# Patient Record
Sex: FEMALE | Race: WHITE | NOT HISPANIC OR LATINO | Employment: FULL TIME | URBAN - METROPOLITAN AREA
[De-identification: names, ages, dates, MRNs, and addresses within clinical notes are randomized per-mention and may not be internally consistent; named-entity substitution may affect disease eponyms.]

---

## 2019-01-18 ENCOUNTER — APPOINTMENT (OUTPATIENT)
Dept: WOMENS IMAGING | Facility: HOSPITAL | Age: 44
End: 2019-01-18

## 2019-01-18 PROCEDURE — 77067 SCR MAMMO BI INCL CAD: CPT | Performed by: RADIOLOGY

## 2020-03-06 ENCOUNTER — APPOINTMENT (OUTPATIENT)
Dept: WOMENS IMAGING | Facility: HOSPITAL | Age: 45
End: 2020-03-06

## 2020-03-06 PROCEDURE — 77067 SCR MAMMO BI INCL CAD: CPT | Performed by: RADIOLOGY

## 2021-03-23 ENCOUNTER — APPOINTMENT (OUTPATIENT)
Dept: WOMENS IMAGING | Facility: HOSPITAL | Age: 46
End: 2021-03-23

## 2021-03-23 PROCEDURE — 77067 SCR MAMMO BI INCL CAD: CPT | Performed by: RADIOLOGY

## 2022-04-21 ENCOUNTER — TRANSCRIBE ORDERS (OUTPATIENT)
Dept: MAMMOGRAPHY | Facility: CLINIC | Age: 47
End: 2022-04-21

## 2022-04-21 DIAGNOSIS — Z12.31 ENCOUNTER FOR SCREENING MAMMOGRAM FOR MALIGNANT NEOPLASM OF BREAST: Primary | ICD-10-CM

## 2022-05-06 ENCOUNTER — APPOINTMENT (OUTPATIENT)
Dept: WOMENS IMAGING | Facility: HOSPITAL | Age: 47
End: 2022-05-06

## 2022-05-06 PROCEDURE — 77067 SCR MAMMO BI INCL CAD: CPT | Performed by: RADIOLOGY

## 2022-05-09 RX ORDER — SERTRALINE HYDROCHLORIDE 25 MG/1
TABLET, FILM COATED ORAL
Qty: 30 TABLET | Refills: 0 | Status: SHIPPED | OUTPATIENT
Start: 2022-05-09 | End: 2022-09-01

## 2022-09-01 RX ORDER — SERTRALINE HYDROCHLORIDE 25 MG/1
TABLET, FILM COATED ORAL
Qty: 30 TABLET | Refills: 0 | Status: SHIPPED | OUTPATIENT
Start: 2022-09-01 | End: 2023-01-09

## 2023-01-09 RX ORDER — SERTRALINE HYDROCHLORIDE 25 MG/1
TABLET, FILM COATED ORAL
Qty: 30 TABLET | Refills: 0 | Status: SHIPPED | OUTPATIENT
Start: 2023-01-09 | End: 2023-01-19 | Stop reason: SDUPTHER

## 2023-01-19 ENCOUNTER — OFFICE VISIT (OUTPATIENT)
Dept: FAMILY MEDICINE CLINIC | Facility: CLINIC | Age: 48
End: 2023-01-19
Payer: COMMERCIAL

## 2023-01-19 VITALS
SYSTOLIC BLOOD PRESSURE: 142 MMHG | WEIGHT: 187.1 LBS | DIASTOLIC BLOOD PRESSURE: 92 MMHG | OXYGEN SATURATION: 98 % | BODY MASS INDEX: 26.19 KG/M2 | TEMPERATURE: 97.7 F | HEART RATE: 73 BPM | HEIGHT: 71 IN

## 2023-01-19 DIAGNOSIS — E55.9 VITAMIN D DEFICIENCY: ICD-10-CM

## 2023-01-19 DIAGNOSIS — R03.0 ELEVATED BLOOD-PRESSURE READING WITHOUT DIAGNOSIS OF HYPERTENSION: ICD-10-CM

## 2023-01-19 DIAGNOSIS — Z12.12 SCREENING FOR COLORECTAL CANCER: ICD-10-CM

## 2023-01-19 DIAGNOSIS — Z00.00 ENCOUNTER FOR WELLNESS EXAMINATION IN ADULT: Primary | ICD-10-CM

## 2023-01-19 DIAGNOSIS — F41.1 GAD (GENERALIZED ANXIETY DISORDER): ICD-10-CM

## 2023-01-19 DIAGNOSIS — F51.04 PSYCHOPHYSIOLOGICAL INSOMNIA: ICD-10-CM

## 2023-01-19 DIAGNOSIS — E53.8 VITAMIN B12 DEFICIENCY: ICD-10-CM

## 2023-01-19 DIAGNOSIS — Z12.11 SCREENING FOR COLORECTAL CANCER: ICD-10-CM

## 2023-01-19 DIAGNOSIS — E66.3 OVERWEIGHT: ICD-10-CM

## 2023-01-19 PROCEDURE — 99396 PREV VISIT EST AGE 40-64: CPT | Performed by: FAMILY MEDICINE

## 2023-01-19 PROCEDURE — 99214 OFFICE O/P EST MOD 30 MIN: CPT | Performed by: FAMILY MEDICINE

## 2023-01-19 RX ORDER — ERGOCALCIFEROL 1.25 MG/1
CAPSULE ORAL
COMMUNITY
End: 2023-03-02

## 2023-01-19 RX ORDER — VITAMIN B COMPLEX
TABLET ORAL
COMMUNITY
End: 2023-03-02

## 2023-01-19 RX ORDER — TRAZODONE HYDROCHLORIDE 50 MG/1
50 TABLET ORAL NIGHTLY
Qty: 30 TABLET | Refills: 5 | Status: SHIPPED | OUTPATIENT
Start: 2023-01-19 | End: 2023-03-02 | Stop reason: SDUPTHER

## 2023-01-19 RX ORDER — SERTRALINE HYDROCHLORIDE 25 MG/1
25 TABLET, FILM COATED ORAL DAILY
Qty: 90 TABLET | Refills: 3 | Status: SHIPPED | OUTPATIENT
Start: 2023-01-19

## 2023-01-19 NOTE — ASSESSMENT & PLAN NOTE
Patient's (Body mass index is 26.1 kg/m².) indicates that they are overweight with health conditions that include none . Weight is unchanged. BMI is is above average; BMI management plan is completed. We discussed portion control and increasing exercise.

## 2023-01-19 NOTE — PROGRESS NOTES
Patient Name: Kandi Humphries  : 1975   MRN: [id number]     Chief Complaint:    Chief Complaint   Patient presents with   • Annual Exam       History of Present Illness: Kandi Humphries is a 47 y.o. female who is here today for their annual health maintenance and physical. Patient presents for follow-up on chronic medical problems including anxiety, vitamin D and vitamin B12 deficiency. Patient denies adverse effects of medications, headache, dizziness, chest pain, palpitations, shortness of breath and cough. Patient complains of worsening anxiety and insomnia. Patient is here for monitoring of chronic issues and fasting lab work.  She is up-to-date on Pap and mammogram, due for colon screening.  She denies family history of colon, breast or ovarian cancer.    Today she complains of worsening anxiety over the last 6 months.  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  She was previously only taking 12.5 mg of sertraline last week she increased to the full 25 mg tablet.  She has not seen a big difference in symptoms yet.  She also reports that she is waking almost every night between 2 and 3 AM with racing thoughts and difficulty going back to sleep.  She was prescribed trazodone years ago but she is hesitant to add another medication at this time.    Blood pressure is also elevated in office today.  She has been monitoring at home with a wrist cuff and reports blood pressures are between 130//90.  She does not have history of hypertension.             Review of Systems:   Review of Systems   Constitutional: Negative for chills, fatigue and fever.   Respiratory: Negative for cough and shortness of breath.    Cardiovascular: Negative for chest pain and palpitations.   Gastrointestinal: Negative for abdominal pain, constipation, diarrhea, nausea and vomiting.   Musculoskeletal: Negative for back pain and myalgias.   Neurological: Negative for  dizziness and headache.   Psychiatric/Behavioral: Positive for sleep disturbance and stress. Negative for depressed mood. The patient is nervous/anxious.        Past Medical History, Social History, Family History and Care Team were all reviewed with patient and updated as appropriate.     Medications:     Current Outpatient Medications:   •  Calcium Carbonate-Vitamin D (calcium-vitamin D) 500-200 MG-UNIT tablet per tablet, , Disp: , Rfl:   •  Cyanocobalamin 2500 MCG sublingual tablet, , Disp: , Rfl:   •  ergocalciferol (ERGOCALCIFEROL) 1.25 MG (11628 UT) capsule, , Disp: , Rfl:   •  Pediatric Multivitamins-Iron (multivitamin chewable) chewable tablet, take 1 tablet by oral route  every day with food, Disp: , Rfl:   •  sertraline (ZOLOFT) 25 MG tablet, Take 1 tablet by mouth Daily., Disp: 90 tablet, Rfl: 3  •  traZODone (DESYREL) 50 MG tablet, Take 1 tablet by mouth Every Night., Disp: 30 tablet, Rfl: 5    Allergies:   Allergies   Allergen Reactions   • Acetaminophen Rash   • Oxycodone Hcl Rash         Depression: PHQ-2 Depression Screening  PHQ-2 Total Score: 0   PHQ-9 Total Score: 0     Intimate partner violence: (Screen on initial visit, pregnant women, women with injuries, older adult with injury or evidence of neglect):  • Violence can be a problem in many people's lives, so I now ask every patient about trauma or abuse they may have experienced in a relationship.  • Stress/Safety - Do you feel safe in your relationship?  • Afraid/Abused - Have you ever been in a relationship where you were threatened, hurt, or afraid?  • Friend/Family - Are your friends aware you have been hurt?  • Emergency Plan - Do you have a safe place to go and the resources you need in an emergency?    Osteoporosis:   • Ost menopausal women < 65 with RF (advancing age, previous fracture, glucocorticoid therapy, parental hip fracture, low body weight, current cigarette smoking, excessive alcohol consumption, rheumatoid arthritis,  "secondary osteoporosis [hypogonadism/premature menopause, malabsorption, chronic liver disease, IBD]).  • All women 65 or older      Physical Exam:  Vital Signs:   Vitals:    01/19/23 1329   BP: 142/92   BP Location: Right arm   Patient Position: Sitting   Cuff Size: Large Adult   Pulse: 73   Temp: 97.7 °F (36.5 °C)   TempSrc: Temporal   SpO2: 98%   Weight: 84.9 kg (187 lb 1.6 oz)   Height: 180.3 cm (71\")     Body mass index is 26.1 kg/m².     Physical Exam  Vitals and nursing note reviewed.   Constitutional:       Appearance: Normal appearance.   HENT:      Head: Normocephalic and atraumatic.      Right Ear: Tympanic membrane and ear canal normal.      Left Ear: Tympanic membrane and ear canal normal.      Nose: Nose normal.      Mouth/Throat:      Mouth: Mucous membranes are moist.      Pharynx: Oropharynx is clear.   Eyes:      Conjunctiva/sclera: Conjunctivae normal.      Pupils: Pupils are equal, round, and reactive to light.   Cardiovascular:      Rate and Rhythm: Normal rate and regular rhythm.      Heart sounds: Normal heart sounds. No murmur heard.  Pulmonary:      Effort: Pulmonary effort is normal.      Breath sounds: Normal breath sounds. No wheezing, rhonchi or rales.   Abdominal:      General: Bowel sounds are normal.      Palpations: Abdomen is soft.      Tenderness: There is no abdominal tenderness.   Musculoskeletal:         General: Normal range of motion.      Cervical back: Normal range of motion and neck supple.      Right lower leg: No edema.      Left lower leg: No edema.   Lymphadenopathy:      Cervical: No cervical adenopathy.   Skin:     General: Skin is warm.      Findings: No rash.   Neurological:      General: No focal deficit present.      Mental Status: She is alert and oriented to person, place, and time.      Motor: No weakness.   Psychiatric:         Mood and Affect: Mood normal.         Behavior: Behavior normal.         Procedures      Assessment/Plan:   Diagnoses and all orders " for this visit:    1. Encounter for wellness examination in adult (Primary)  Assessment & Plan:  Patient will return for fasting labs, referral to GI placed for colon screening    Orders:  -     Hemoglobin A1c; Future  -     CBC Auto Differential; Future  -     Comprehensive Metabolic Panel; Future  -     Lipid Panel; Future  -     TSH; Future  -     T4, Free; Future    2. JACIEL (generalized anxiety disorder)  Assessment & Plan:  Psychological condition is worsening.  Increase Zoloft to 25 mg daily  Psychological condition  will be reassessed In 6 weeks.    Orders:  -     sertraline (ZOLOFT) 25 MG tablet; Take 1 tablet by mouth Daily.  Dispense: 90 tablet; Refill: 3    3. Psychophysiological insomnia  Assessment & Plan:  Psychological condition is worsening.  Rx trazodone 50 mg 1/2 to 1 tablet at bedtime as needed  Psychological condition  will be reassessed In 6 weeks.    Orders:  -     traZODone (DESYREL) 50 MG tablet; Take 1 tablet by mouth Every Night.  Dispense: 30 tablet; Refill: 5    4. Elevated blood-pressure reading without diagnosis of hypertension  Assessment & Plan:  Patient will monitor ambulatory blood pressures and follow-up with blood pressure log in 6 weeks      5. Vitamin D deficiency  -     Vitamin D,25-Hydroxy; Future    6. Vitamin B12 deficiency  -     Vitamin B12; Future    7. Screening for colorectal cancer  -     Ambulatory Referral to Gastroenterology    8. Overweight  Assessment & Plan:  Patient's (Body mass index is 26.1 kg/m².) indicates that they are overweight with health conditions that include none . Weight is unchanged. BMI is is above average; BMI management plan is completed. We discussed portion control and increasing exercise.            Follow Up:   Return in about 6 weeks (around 3/2/2023) for Med Recheck.    Healthcare Maintenance:   Counseling provided on Discussed injury prevention, diet and exercise, safe sexual practices, and screening for common diseases. Encouraged use of  sunscreen and seatbelts. Encouraged SBE, avoidance of tobacco, limiting alcohol, and yearly dental and eye exams.   .   Kandi Humphries voices understanding and acceptance of this advice and will call back with any further questions or concerns. AVS with preventive healthcare tips printed for patient.     Annalisa Smith DO  [facility] [place] [facility]

## 2023-01-19 NOTE — ASSESSMENT & PLAN NOTE
Psychological condition is worsening.  Rx trazodone 50 mg 1/2 to 1 tablet at bedtime as needed  Psychological condition  will be reassessed In 6 weeks.

## 2023-01-19 NOTE — ASSESSMENT & PLAN NOTE
Psychological condition is worsening.  Increase Zoloft to 25 mg daily  Psychological condition  will be reassessed In 6 weeks.

## 2023-01-20 ENCOUNTER — LAB (OUTPATIENT)
Dept: FAMILY MEDICINE CLINIC | Facility: CLINIC | Age: 48
End: 2023-01-20
Payer: COMMERCIAL

## 2023-01-20 DIAGNOSIS — E53.8 VITAMIN B12 DEFICIENCY: ICD-10-CM

## 2023-01-20 DIAGNOSIS — E55.9 VITAMIN D DEFICIENCY: ICD-10-CM

## 2023-01-20 DIAGNOSIS — Z00.00 ENCOUNTER FOR WELLNESS EXAMINATION IN ADULT: ICD-10-CM

## 2023-01-20 PROCEDURE — 36415 COLL VENOUS BLD VENIPUNCTURE: CPT | Performed by: FAMILY MEDICINE

## 2023-01-21 LAB
25(OH)D3+25(OH)D2 SERPL-MCNC: 80.2 NG/ML (ref 30–100)
ALBUMIN SERPL-MCNC: 4.7 G/DL (ref 3.8–4.8)
ALBUMIN/GLOB SERPL: 2.1 {RATIO} (ref 1.2–2.2)
ALP SERPL-CCNC: 57 IU/L (ref 44–121)
ALT SERPL-CCNC: 12 IU/L (ref 0–32)
AST SERPL-CCNC: 17 IU/L (ref 0–40)
BASOPHILS # BLD AUTO: 0 X10E3/UL (ref 0–0.2)
BASOPHILS NFR BLD AUTO: 1 %
BILIRUB SERPL-MCNC: 0.5 MG/DL (ref 0–1.2)
BUN SERPL-MCNC: 9 MG/DL (ref 6–24)
BUN/CREAT SERPL: 13 (ref 9–23)
CALCIUM SERPL-MCNC: 9.5 MG/DL (ref 8.7–10.2)
CHLORIDE SERPL-SCNC: 103 MMOL/L (ref 96–106)
CHOLEST SERPL-MCNC: 203 MG/DL (ref 100–199)
CO2 SERPL-SCNC: 25 MMOL/L (ref 20–29)
CREAT SERPL-MCNC: 0.68 MG/DL (ref 0.57–1)
EGFRCR SERPLBLD CKD-EPI 2021: 109 ML/MIN/1.73
EOSINOPHIL # BLD AUTO: 0.1 X10E3/UL (ref 0–0.4)
EOSINOPHIL NFR BLD AUTO: 3 %
ERYTHROCYTE [DISTWIDTH] IN BLOOD BY AUTOMATED COUNT: 12.1 % (ref 11.7–15.4)
GLOBULIN SER CALC-MCNC: 2.2 G/DL (ref 1.5–4.5)
GLUCOSE SERPL-MCNC: 75 MG/DL (ref 70–99)
HBA1C MFR BLD: 5 % (ref 4.8–5.6)
HCT VFR BLD AUTO: 41.6 % (ref 34–46.6)
HDLC SERPL-MCNC: 77 MG/DL
HGB BLD-MCNC: 14 G/DL (ref 11.1–15.9)
IMM GRANULOCYTES # BLD AUTO: 0 X10E3/UL (ref 0–0.1)
IMM GRANULOCYTES NFR BLD AUTO: 0 %
LDLC SERPL CALC-MCNC: 102 MG/DL (ref 0–99)
LYMPHOCYTES # BLD AUTO: 1.4 X10E3/UL (ref 0.7–3.1)
LYMPHOCYTES NFR BLD AUTO: 32 %
MCH RBC QN AUTO: 31.8 PG (ref 26.6–33)
MCHC RBC AUTO-ENTMCNC: 33.7 G/DL (ref 31.5–35.7)
MCV RBC AUTO: 95 FL (ref 79–97)
MONOCYTES # BLD AUTO: 0.3 X10E3/UL (ref 0.1–0.9)
MONOCYTES NFR BLD AUTO: 6 %
NEUTROPHILS # BLD AUTO: 2.6 X10E3/UL (ref 1.4–7)
NEUTROPHILS NFR BLD AUTO: 58 %
PLATELET # BLD AUTO: 303 X10E3/UL (ref 150–450)
POTASSIUM SERPL-SCNC: 4.5 MMOL/L (ref 3.5–5.2)
PROT SERPL-MCNC: 6.9 G/DL (ref 6–8.5)
RBC # BLD AUTO: 4.4 X10E6/UL (ref 3.77–5.28)
SODIUM SERPL-SCNC: 142 MMOL/L (ref 134–144)
T4 FREE SERPL-MCNC: 1.1 NG/DL (ref 0.82–1.77)
TRIGL SERPL-MCNC: 137 MG/DL (ref 0–149)
TSH SERPL DL<=0.005 MIU/L-ACNC: 3.78 UIU/ML (ref 0.45–4.5)
VIT B12 SERPL-MCNC: 1180 PG/ML (ref 232–1245)
VLDLC SERPL CALC-MCNC: 24 MG/DL (ref 5–40)
WBC # BLD AUTO: 4.4 X10E3/UL (ref 3.4–10.8)

## 2023-03-02 ENCOUNTER — OFFICE VISIT (OUTPATIENT)
Dept: FAMILY MEDICINE CLINIC | Facility: CLINIC | Age: 48
End: 2023-03-02
Payer: COMMERCIAL

## 2023-03-02 VITALS
SYSTOLIC BLOOD PRESSURE: 140 MMHG | TEMPERATURE: 97.8 F | OXYGEN SATURATION: 99 % | HEART RATE: 72 BPM | DIASTOLIC BLOOD PRESSURE: 88 MMHG | HEIGHT: 71 IN | WEIGHT: 183.2 LBS | BODY MASS INDEX: 25.65 KG/M2

## 2023-03-02 DIAGNOSIS — I10 PRIMARY HYPERTENSION: ICD-10-CM

## 2023-03-02 DIAGNOSIS — F41.1 GAD (GENERALIZED ANXIETY DISORDER): Primary | ICD-10-CM

## 2023-03-02 DIAGNOSIS — F51.04 PSYCHOPHYSIOLOGICAL INSOMNIA: ICD-10-CM

## 2023-03-02 DIAGNOSIS — E66.3 OVERWEIGHT: ICD-10-CM

## 2023-03-02 PROCEDURE — 99214 OFFICE O/P EST MOD 30 MIN: CPT | Performed by: FAMILY MEDICINE

## 2023-03-02 RX ORDER — TRAZODONE HYDROCHLORIDE 50 MG/1
50 TABLET ORAL NIGHTLY
Qty: 90 TABLET | Refills: 3 | Status: SHIPPED | OUTPATIENT
Start: 2023-03-02

## 2023-03-02 RX ORDER — LANOLIN ALCOHOL/MO/W.PET/CERES
CREAM (GRAM) TOPICAL
COMMUNITY
Start: 2023-02-01

## 2023-03-02 RX ORDER — OLMESARTAN MEDOXOMIL 5 MG/1
5 TABLET ORAL DAILY
Qty: 30 TABLET | Refills: 5 | Status: SHIPPED | OUTPATIENT
Start: 2023-03-02

## 2023-03-02 RX ORDER — MULTIVITAMIN WITH IRON
TABLET ORAL
COMMUNITY
Start: 2023-02-01

## 2023-03-02 RX ORDER — MULTIPLE VITAMINS W/ MINERALS TAB 9MG-400MCG
TAB ORAL
COMMUNITY
Start: 2023-02-01

## 2023-03-02 NOTE — PROGRESS NOTES
Date: 2023   Patient Name: Kandi Humphries  : 1975   MRN: [id number]     Chief Complaint:    Chief Complaint   Patient presents with   • Anxiety   • Hypertension       History of Present Illness: Kandi Humphries is a 47 y.o. female who is here today to follow up for Anxiety and elevated blood pressure.  Ambulatory blood pressures are for the most part mildly elevated.  She has been treated for hypertension in the past.  She denies headache, chest pain, or shortness of breath although has been having some intermittent episodes of chest pressure.    She reports anxiety is improved after increasing Zoloft to 25 mg.  She denies side effects to the medication would like to stay at her current dose.    Sleep is also improved after adding trazodone 50 mg at bedtime.  She denies side effects and would like to stay on the current dose.           Review of Systems:   Review of Systems   Constitutional: Negative for chills, fatigue and fever.   Respiratory: Negative for cough and shortness of breath.    Cardiovascular: Negative for chest pain and palpitations.   Gastrointestinal: Negative for abdominal pain, constipation, diarrhea, nausea and vomiting.   Musculoskeletal: Negative for back pain and myalgias.   Neurological: Negative for dizziness and headache.   Psychiatric/Behavioral: Positive for sleep disturbance. Negative for depressed mood. The patient is nervous/anxious.        Past Medical History:   Past Medical History:   Diagnosis Date   • H/O abdominoplasty    • Hypertension    • Monty-en-Y varices        Past Surgical History:   Past Surgical History:   Procedure Laterality Date   • GASTRIC BYPASS     • REDUCTION MAMMAPLASTY Bilateral        Family History:   Family History   Problem Relation Age of Onset   • Diabetes Mother    • Hypertension Mother    • Hyperlipidemia Mother    • Diabetes Sister    • Diabetes Paternal Grandmother    • Lung cancer Paternal Grandfather    • Heart attack  "Paternal Grandfather    • Coronary artery disease Paternal Grandfather    • Brain cancer Paternal Uncle        Social History:   Social History     Socioeconomic History   • Marital status:    Tobacco Use   • Smoking status: Never   • Smokeless tobacco: Never   Vaping Use   • Vaping Use: Never used   Substance and Sexual Activity   • Alcohol use: Yes     Comment: occas   • Drug use: Never   • Sexual activity: Yes     Partners: Male       Medications:     Current Outpatient Medications:   •  Calcium Citrate 333 MG tablet, , Disp: , Rfl:   •  Coenzyme Q10 (Co Q 10) 100 MG capsule, , Disp: , Rfl:   •  Garlic 1000 MG capsule, , Disp: , Rfl:   •  Magnesium 250 MG tablet, , Disp: , Rfl:   •  multivitamin with minerals tablet tablet, , Disp: , Rfl:   •  sertraline (ZOLOFT) 25 MG tablet, Take 1 tablet by mouth Daily., Disp: 90 tablet, Rfl: 3  •  traZODone (DESYREL) 50 MG tablet, Take 1 tablet by mouth Every Night., Disp: 90 tablet, Rfl: 3  •  vitamin B-12 (CYANOCOBALAMIN) 1000 MCG tablet, , Disp: , Rfl:   •  vitamin D3 125 MCG (5000 UT) capsule capsule, , Disp: , Rfl:   •  olmesartan (BENICAR) 5 MG tablet, Take 1 tablet by mouth Daily., Disp: 30 tablet, Rfl: 5    Allergies:   Allergies   Allergen Reactions   • Acetaminophen Rash   • Oxycodone Hcl Rash       PHQ-2 Total Score: 0   PHQ-9 Total Score: 0     Physical Exam:  Vital Signs:   Vitals:    03/02/23 0917   BP: 140/88   Pulse: 72   Temp: 97.8 °F (36.6 °C)   TempSrc: Temporal   SpO2: 99%   Weight: 83.1 kg (183 lb 3.2 oz)   Height: 180.3 cm (70.98\")     Body mass index is 25.56 kg/m².     Physical Exam  Vitals and nursing note reviewed.   Constitutional:       Appearance: Normal appearance.   Cardiovascular:      Rate and Rhythm: Normal rate and regular rhythm.      Heart sounds: Normal heart sounds. No murmur heard.  Pulmonary:      Effort: Pulmonary effort is normal.      Breath sounds: Normal breath sounds. No wheezing.   Abdominal:      General: Bowel sounds " are normal.      Palpations: Abdomen is soft.   Neurological:      Mental Status: She is alert and oriented to person, place, and time. Mental status is at baseline.   Psychiatric:         Mood and Affect: Mood normal.         Behavior: Behavior normal.           Assessment/Plan:   Diagnoses and all orders for this visit:    1. JACIEL (generalized anxiety disorder) (Primary)  Assessment & Plan:  Psychological condition is improving with treatment.  Continue current treatment regimen.  Psychological condition  will be reassessed in 4 weeks.      2. Psychophysiological insomnia  Assessment & Plan:  Psychological condition is improving with treatment.  Continue current treatment regimen.  Psychological condition  will be reassessed in 4 weeks.    Orders:  -     traZODone (DESYREL) 50 MG tablet; Take 1 tablet by mouth Every Night.  Dispense: 90 tablet; Refill: 3    3. Primary hypertension  Assessment & Plan:  Hypertension is newly identified.  Weight loss.  Regular aerobic exercise.  Ambulatory blood pressure monitoring.  Start olmesartan 5 mg daily  Blood pressure will be reassessed in 4 weeks.    Orders:  -     olmesartan (BENICAR) 5 MG tablet; Take 1 tablet by mouth Daily.  Dispense: 30 tablet; Refill: 5    4. Overweight  Assessment & Plan:  Patient's (Body mass index is 25.56 kg/m².) indicates that they are overweight with health conditions that include hypertension . Weight is unchanged. BMI is is above average; BMI management plan is completed. We discussed portion control and increasing exercise.            Follow Up:   Return in about 1 month (around 4/2/2023) for BP check.    Annalisa Smith DO  [facility] [place] [facility]

## 2023-03-02 NOTE — ASSESSMENT & PLAN NOTE
Hypertension is newly identified.  Weight loss.  Regular aerobic exercise.  Ambulatory blood pressure monitoring.  Start olmesartan 5 mg daily  Blood pressure will be reassessed in 4 weeks.

## 2023-03-02 NOTE — ASSESSMENT & PLAN NOTE
Patient's (Body mass index is 25.56 kg/m².) indicates that they are overweight with health conditions that include hypertension . Weight is unchanged. BMI is is above average; BMI management plan is completed. We discussed portion control and increasing exercise.

## 2023-04-07 ENCOUNTER — OFFICE VISIT (OUTPATIENT)
Dept: FAMILY MEDICINE CLINIC | Facility: CLINIC | Age: 48
End: 2023-04-07
Payer: COMMERCIAL

## 2023-04-07 VITALS
SYSTOLIC BLOOD PRESSURE: 118 MMHG | HEART RATE: 71 BPM | DIASTOLIC BLOOD PRESSURE: 71 MMHG | WEIGHT: 186.1 LBS | BODY MASS INDEX: 26.64 KG/M2 | HEIGHT: 70 IN | TEMPERATURE: 97.3 F | OXYGEN SATURATION: 100 %

## 2023-04-07 DIAGNOSIS — F51.04 PSYCHOPHYSIOLOGICAL INSOMNIA: ICD-10-CM

## 2023-04-07 DIAGNOSIS — I10 PRIMARY HYPERTENSION: Primary | ICD-10-CM

## 2023-04-07 DIAGNOSIS — E66.3 OVERWEIGHT: ICD-10-CM

## 2023-04-07 PROBLEM — E66.01 MORBID OBESITY DUE TO EXCESS CALORIES: Status: ACTIVE | Noted: 2023-02-17

## 2023-04-07 NOTE — ASSESSMENT & PLAN NOTE
Patient's (Body mass index is 26.7 kg/m².) indicates that they are overweight with health conditions that include hypertension . Weight is unchanged. BMI is is above average; BMI management plan is completed. We discussed portion control and increasing exercise.

## 2023-04-07 NOTE — ASSESSMENT & PLAN NOTE
Psychological condition is improving with treatment.  Increase trazodone to 75 mg at bedtime  Psychological condition  will be reassessed in 3 months.

## 2023-04-24 ENCOUNTER — TRANSCRIBE ORDERS (OUTPATIENT)
Dept: CT IMAGING | Facility: CLINIC | Age: 48
End: 2023-04-24
Payer: COMMERCIAL

## 2023-04-24 DIAGNOSIS — Z12.31 ENCOUNTER FOR SCREENING MAMMOGRAM FOR MALIGNANT NEOPLASM OF BREAST: Primary | ICD-10-CM

## 2023-08-09 DIAGNOSIS — I10 PRIMARY HYPERTENSION: ICD-10-CM

## 2023-08-09 RX ORDER — OLMESARTAN MEDOXOMIL 5 MG/1
TABLET ORAL
Qty: 90 TABLET | Refills: 1 | Status: SHIPPED | OUTPATIENT
Start: 2023-08-09

## 2023-11-30 ENCOUNTER — OFFICE VISIT (OUTPATIENT)
Dept: FAMILY MEDICINE CLINIC | Facility: CLINIC | Age: 48
End: 2023-11-30
Payer: COMMERCIAL

## 2023-11-30 VITALS
OXYGEN SATURATION: 97 % | HEART RATE: 69 BPM | BODY MASS INDEX: 27.49 KG/M2 | DIASTOLIC BLOOD PRESSURE: 72 MMHG | HEIGHT: 70 IN | WEIGHT: 192 LBS | SYSTOLIC BLOOD PRESSURE: 120 MMHG

## 2023-11-30 DIAGNOSIS — J30.1 SEASONAL ALLERGIC RHINITIS DUE TO POLLEN: Primary | ICD-10-CM

## 2023-11-30 PROCEDURE — 99213 OFFICE O/P EST LOW 20 MIN: CPT | Performed by: STUDENT IN AN ORGANIZED HEALTH CARE EDUCATION/TRAINING PROGRAM

## 2023-11-30 RX ORDER — LEVOMEFOLATE CALCIUM 15 MG
15 TABLET ORAL DAILY
COMMUNITY

## 2023-11-30 RX ORDER — AZELASTINE 1 MG/ML
2 SPRAY, METERED NASAL 2 TIMES DAILY
Qty: 30 ML | Refills: 1 | Status: SHIPPED | OUTPATIENT
Start: 2023-11-30

## 2023-11-30 NOTE — PROGRESS NOTES
"Chief Complaint  Establish Care (Pt is transferring care from [place]. Her spouse sees Dr. Monreal and pt wants a provider in [place]. )    Subjective          Kandi Humphries presents to [facility]  History of Present Illness    Patient is here to establish care.     She states that she has been seeing Dr. Smith at [facility]. She states that she lives here in town and it is closer for her to come here.     She states that overall she is doing well at this time, but has had some increased fullness in her ears. She states that she does not need medications refills at this time on her maintenance mediations.         Objective   Vital Signs:   /72   Pulse 69   Ht 177.8 cm (70\")   Wt 87.1 kg (192 lb)   SpO2 97%   BMI 27.55 kg/m²     Body mass index is 27.55 kg/m².    Review of Systems    Past History:  Medical History: has a past medical history of Allergic, Anxiety, Depression, H/O abdominoplasty, Hypertension, and Monty-en-Y varices (2011).   Surgical History: has a past surgical history that includes Reduction mammaplasty (Bilateral, 2013); Gastric bypass; Bariatric Surgery (2011); Cosmetic surgery (2013); Endometrial ablation (May 2020); Colonoscopy (2023); and Eye surgery (2005).   Family History: family history includes Brain cancer in her paternal uncle; Coronary artery disease in her paternal grandfather; Depression in her mother and sister; Diabetes in her mother, paternal grandmother, and sister; Heart attack in her paternal grandfather; Heart disease in her paternal grandfather; Hyperlipidemia in her mother; Hypertension in her father and mother; Lung cancer in her paternal grandfather.   Social History: reports that she has never smoked. She has never used smokeless tobacco. She reports current alcohol use of about 3.0 standard drinks of alcohol per week. She reports that she does not use drugs.      Current Outpatient Medications:     azelastine " (ASTELIN) 0.1 % nasal spray, 2 sprays into the nostril(s) as directed by provider 2 (Two) Times a Day. Use in each nostril as directed, Disp: 30 mL, Rfl: 1    Calcium Citrate 333 MG tablet, , Disp: , Rfl:     Coenzyme Q10 (Co Q 10) 100 MG capsule, , Disp: , Rfl:     Garlic 1000 MG capsule, , Disp: , Rfl:     Iron Combinations (IRON COMPLEX PO), Take  by mouth., Disp: , Rfl:     l-methylfolate 15 MG tablet tablet, Take 1 tablet by mouth Daily., Disp: , Rfl:     Magnesium 250 MG tablet, , Disp: , Rfl:     multivitamin with minerals tablet tablet, , Disp: , Rfl:     olmesartan (BENICAR) 5 MG tablet, Take 1 tablet by mouth once daily, Disp: 90 tablet, Rfl: 1    sertraline (ZOLOFT) 25 MG tablet, Take 1 tablet by mouth Daily., Disp: 90 tablet, Rfl: 3    traZODone (DESYREL) 50 MG tablet, Take 1 tablet by mouth Every Night., Disp: 90 tablet, Rfl: 3    vitamin B-12 (CYANOCOBALAMIN) 1000 MCG tablet, , Disp: , Rfl:     vitamin D3 125 MCG (5000 UT) capsule capsule, , Disp: , Rfl:     Allergies: Acetaminophen and Oxycodone hcl    Physical Exam  Constitutional:       General: She is not in acute distress.     Appearance: She is not ill-appearing or toxic-appearing.   HENT:      Head: Normocephalic and atraumatic.   Cardiovascular:      Rate and Rhythm: Normal rate and regular rhythm.      Heart sounds: No murmur heard.  Pulmonary:      Effort: Pulmonary effort is normal. No respiratory distress.   Neurological:      General: No focal deficit present.      Mental Status: She is alert and oriented to person, place, and time.   Psychiatric:         Mood and Affect: Mood normal.         Thought Content: Thought content normal.          Result Review :                   Assessment and Plan    Diagnoses and all orders for this visit:    1. Seasonal allergic rhinitis due to pollen (Primary)    Other orders  -     azelastine (ASTELIN) 0.1 % nasal spray; 2 sprays into the nostril(s) as directed by provider 2 (Two) Times a Day. Use in each  nostril as directed  Dispense: 30 mL; Refill: 1    Reviewed patient's medical history with her in the office today, and overall she is doing very well at this time.    Will do azelastine nasal spray to help with allergic rhinitis and effusions of the ears. Call with any new or worsening symptoms.     Will see back in January for annual physical.     Follow Up   No follow-ups on file.  Patient was given instructions and counseling regarding her condition or for health maintenance advice. Please see specific information pulled into the AVS if appropriate.     Marquita Monreal, DO

## 2024-02-01 ENCOUNTER — OFFICE VISIT (OUTPATIENT)
Dept: FAMILY MEDICINE CLINIC | Facility: CLINIC | Age: 49
End: 2024-02-01
Payer: COMMERCIAL

## 2024-02-01 VITALS
DIASTOLIC BLOOD PRESSURE: 76 MMHG | HEART RATE: 78 BPM | BODY MASS INDEX: 28.06 KG/M2 | HEIGHT: 70 IN | WEIGHT: 196 LBS | SYSTOLIC BLOOD PRESSURE: 120 MMHG | OXYGEN SATURATION: 98 %

## 2024-02-01 DIAGNOSIS — Z00.00 WELL ADULT EXAM: Primary | ICD-10-CM

## 2024-02-01 DIAGNOSIS — F41.1 GAD (GENERALIZED ANXIETY DISORDER): ICD-10-CM

## 2024-02-01 DIAGNOSIS — I10 PRIMARY HYPERTENSION: ICD-10-CM

## 2024-02-01 DIAGNOSIS — J30.1 SEASONAL ALLERGIC RHINITIS DUE TO POLLEN: ICD-10-CM

## 2024-02-01 PROCEDURE — 99396 PREV VISIT EST AGE 40-64: CPT | Performed by: STUDENT IN AN ORGANIZED HEALTH CARE EDUCATION/TRAINING PROGRAM

## 2024-02-01 RX ORDER — BLACK COHOSH ROOT EXTRACT 80 MG
CAPSULE ORAL
COMMUNITY

## 2024-02-01 NOTE — PROGRESS NOTES
"Chief Complaint  Annual Exam    Subjective          Kandi Humphries presents to [facility]  History of Present Illness    Patient is here for well visit.     She states that she has been having trouble with her knees. She sates that she used to have a sit to stand desk which helped out with this, but she does not have this now that she is working from home. She states that it is not somehting that bothers her on a daily basis, but she wanted to mention it.     She states that otherwise she is doing well. All of her medications are doing well and she is tolerating them without side effects. She states that she does not need refills.     She is up todate on cervical, breast and colon cancer screening.     She is due for blood work at this time.     Objective   Vital Signs:   /76   Pulse 78   Ht 177.8 cm (70\")   Wt 88.9 kg (196 lb)   SpO2 98%   BMI 28.12 kg/m²     Body mass index is 28.12 kg/m².    Review of Systems    Past History:  Medical History: has a past medical history of Allergic, Anxiety, Depression, H/O abdominoplasty, Hypertension, and Monty-en-Y varices (2011).   Surgical History: has a past surgical history that includes Reduction mammaplasty (Bilateral, 2013); Gastric bypass; Bariatric Surgery (2011); Cosmetic surgery (2013); Endometrial ablation (May 2020); Colonoscopy (2023); and Eye surgery (2005).   Family History: family history includes Brain cancer in her paternal uncle; Coronary artery disease in her paternal grandfather; Depression in her mother and sister; Diabetes in her mother, paternal grandmother, and sister; Heart attack in her paternal grandfather; Heart disease in her paternal grandfather; Hyperlipidemia in her mother; Hypertension in her father and mother; Lung cancer in her paternal grandfather; Vision loss in her paternal grandfather.   Social History: reports that she has never smoked. She has never used smokeless tobacco. She reports current " alcohol use of about 3.0 standard drinks of alcohol per week. She reports that she does not use drugs.      Current Outpatient Medications:     Calcium Citrate 333 MG tablet, , Disp: , Rfl:     Coenzyme Q10 (Co Q 10) 100 MG capsule, , Disp: , Rfl:     Iron Combinations (IRON COMPLEX PO), Take  by mouth., Disp: , Rfl:     l-methylfolate 15 MG tablet tablet, Take 1 tablet by mouth Daily., Disp: , Rfl:     Magnesium 250 MG tablet, , Disp: , Rfl:     multivitamin with minerals tablet tablet, , Disp: , Rfl:     olmesartan (BENICAR) 5 MG tablet, Take 1 tablet by mouth once daily, Disp: 90 tablet, Rfl: 1    Omega 3-6-9 Fatty Acids (Omega 3-6-9 Complex) capsule, , Disp: , Rfl:     sertraline (ZOLOFT) 25 MG tablet, Take 1 tablet by mouth Daily., Disp: 90 tablet, Rfl: 3    traZODone (DESYREL) 50 MG tablet, Take 1 tablet by mouth Every Night., Disp: 90 tablet, Rfl: 3    vitamin B-12 (CYANOCOBALAMIN) 1000 MCG tablet, , Disp: , Rfl:     vitamin D3 125 MCG (5000 UT) capsule capsule, , Disp: , Rfl:     Allergies: Acetaminophen and Oxycodone hcl    Physical Exam  Constitutional:       General: She is not in acute distress.     Appearance: She is not ill-appearing or toxic-appearing.   HENT:      Head: Normocephalic and atraumatic.   Cardiovascular:      Rate and Rhythm: Normal rate and regular rhythm.      Heart sounds: No murmur heard.  Pulmonary:      Effort: Pulmonary effort is normal. No respiratory distress.   Neurological:      General: No focal deficit present.      Mental Status: She is alert and oriented to person, place, and time.   Psychiatric:         Mood and Affect: Mood normal.         Thought Content: Thought content normal.          Result Review :                   Assessment and Plan    Diagnoses and all orders for this visit:    1. Well adult exam (Primary)  -     Comprehensive Metabolic Panel  -     CBC & Differential  -     Hemoglobin A1c  -     Lipid Panel  -     TSH  -     T4, Free    2. Primary  hypertension    3. Seasonal allergic rhinitis due to pollen    4. JACIEL (generalized anxiety disorder)    Blood work ordered and will contact with results when available. Will adjust medications based on abnormalities seen.     Patient has had gastric bypass surgery and would likely benefit from early bone density screening.     Past medical and surgical history as well as allergies, family history and social history were reviewed, and discussed with patient.  Chronic conditions were reviewed as well as medications.   Anticipatory guidance handouts including healthy diet, health maintenance, as well as regular exercise and general instructions were given via [employer], and patient was able to ask questions and discuss any concerns.        Follow Up   No follow-ups on file.  Patient was given instructions and counseling regarding her condition or for health maintenance advice. Please see specific information pulled into the AVS if appropriate.     Marquita Monreal, DO

## 2024-02-02 DIAGNOSIS — F41.1 GAD (GENERALIZED ANXIETY DISORDER): ICD-10-CM

## 2024-02-02 DIAGNOSIS — F51.04 PSYCHOPHYSIOLOGICAL INSOMNIA: ICD-10-CM

## 2024-02-02 DIAGNOSIS — I10 PRIMARY HYPERTENSION: ICD-10-CM

## 2024-02-02 LAB
ALBUMIN SERPL-MCNC: 4.6 G/DL (ref 3.9–4.9)
ALBUMIN/GLOB SERPL: 2.3 {RATIO} (ref 1.2–2.2)
ALP SERPL-CCNC: 55 IU/L (ref 44–121)
ALT SERPL-CCNC: 23 IU/L (ref 0–32)
AST SERPL-CCNC: 22 IU/L (ref 0–40)
BASOPHILS # BLD AUTO: 0 X10E3/UL (ref 0–0.2)
BASOPHILS NFR BLD AUTO: 0 %
BILIRUB SERPL-MCNC: 0.2 MG/DL (ref 0–1.2)
BUN SERPL-MCNC: 16 MG/DL (ref 6–24)
BUN/CREAT SERPL: 22 (ref 9–23)
CALCIUM SERPL-MCNC: 9.2 MG/DL (ref 8.7–10.2)
CHLORIDE SERPL-SCNC: 104 MMOL/L (ref 96–106)
CHOLEST SERPL-MCNC: 190 MG/DL (ref 100–199)
CO2 SERPL-SCNC: 24 MMOL/L (ref 20–29)
CREAT SERPL-MCNC: 0.73 MG/DL (ref 0.57–1)
EGFRCR SERPLBLD CKD-EPI 2021: 103 ML/MIN/1.73
EOSINOPHIL # BLD AUTO: 0.1 X10E3/UL (ref 0–0.4)
EOSINOPHIL NFR BLD AUTO: 2 %
ERYTHROCYTE [DISTWIDTH] IN BLOOD BY AUTOMATED COUNT: 12.1 % (ref 11.7–15.4)
GLOBULIN SER CALC-MCNC: 2 G/DL (ref 1.5–4.5)
GLUCOSE SERPL-MCNC: 90 MG/DL (ref 70–99)
HBA1C MFR BLD: 5 % (ref 4.8–5.6)
HCT VFR BLD AUTO: 40.7 % (ref 34–46.6)
HDLC SERPL-MCNC: 67 MG/DL
HGB BLD-MCNC: 13.3 G/DL (ref 11.1–15.9)
IMM GRANULOCYTES # BLD AUTO: 0 X10E3/UL (ref 0–0.1)
IMM GRANULOCYTES NFR BLD AUTO: 0 %
LDLC SERPL CALC-MCNC: 86 MG/DL (ref 0–99)
LYMPHOCYTES # BLD AUTO: 2.3 X10E3/UL (ref 0.7–3.1)
LYMPHOCYTES NFR BLD AUTO: 33 %
MCH RBC QN AUTO: 31.7 PG (ref 26.6–33)
MCHC RBC AUTO-ENTMCNC: 32.7 G/DL (ref 31.5–35.7)
MCV RBC AUTO: 97 FL (ref 79–97)
MONOCYTES # BLD AUTO: 0.4 X10E3/UL (ref 0.1–0.9)
MONOCYTES NFR BLD AUTO: 5 %
NEUTROPHILS # BLD AUTO: 4.1 X10E3/UL (ref 1.4–7)
NEUTROPHILS NFR BLD AUTO: 60 %
PLATELET # BLD AUTO: 315 X10E3/UL (ref 150–450)
POTASSIUM SERPL-SCNC: 4.5 MMOL/L (ref 3.5–5.2)
PROT SERPL-MCNC: 6.6 G/DL (ref 6–8.5)
RBC # BLD AUTO: 4.2 X10E6/UL (ref 3.77–5.28)
SODIUM SERPL-SCNC: 141 MMOL/L (ref 134–144)
T4 FREE SERPL-MCNC: 1.05 NG/DL (ref 0.82–1.77)
TRIGL SERPL-MCNC: 226 MG/DL (ref 0–149)
TSH SERPL DL<=0.005 MIU/L-ACNC: 1.26 UIU/ML (ref 0.45–4.5)
VLDLC SERPL CALC-MCNC: 37 MG/DL (ref 5–40)
WBC # BLD AUTO: 6.9 X10E3/UL (ref 3.4–10.8)

## 2024-02-02 RX ORDER — TRAZODONE HYDROCHLORIDE 50 MG/1
50 TABLET ORAL NIGHTLY
Qty: 90 TABLET | Refills: 3 | Status: SHIPPED | OUTPATIENT
Start: 2024-02-02

## 2024-02-02 RX ORDER — OLMESARTAN MEDOXOMIL 5 MG/1
5 TABLET ORAL DAILY
Qty: 90 TABLET | Refills: 1 | Status: SHIPPED | OUTPATIENT
Start: 2024-02-02

## 2024-02-02 RX ORDER — SERTRALINE HYDROCHLORIDE 25 MG/1
25 TABLET, FILM COATED ORAL DAILY
Qty: 90 TABLET | Refills: 3 | Status: SHIPPED | OUTPATIENT
Start: 2024-02-02

## 2024-05-13 ENCOUNTER — TRANSCRIBE ORDERS (OUTPATIENT)
Dept: MAMMOGRAPHY | Facility: CLINIC | Age: 49
End: 2024-05-13
Payer: COMMERCIAL

## 2024-05-13 DIAGNOSIS — Z12.31 ENCOUNTER FOR SCREENING MAMMOGRAM FOR MALIGNANT NEOPLASM OF BREAST: Primary | ICD-10-CM

## 2024-05-21 ENCOUNTER — OFFICE VISIT (OUTPATIENT)
Dept: FAMILY MEDICINE CLINIC | Facility: CLINIC | Age: 49
End: 2024-05-21
Payer: COMMERCIAL

## 2024-05-21 VITALS
OXYGEN SATURATION: 98 % | HEIGHT: 70 IN | BODY MASS INDEX: 28.92 KG/M2 | DIASTOLIC BLOOD PRESSURE: 80 MMHG | SYSTOLIC BLOOD PRESSURE: 122 MMHG | WEIGHT: 202 LBS | HEART RATE: 75 BPM

## 2024-05-21 DIAGNOSIS — M25.512 ACUTE PAIN OF LEFT SHOULDER: Primary | ICD-10-CM

## 2024-05-21 PROCEDURE — 99213 OFFICE O/P EST LOW 20 MIN: CPT | Performed by: STUDENT IN AN ORGANIZED HEALTH CARE EDUCATION/TRAINING PROGRAM

## 2024-05-21 RX ORDER — LANSOPRAZOLE 30 MG/1
30 CAPSULE, DELAYED RELEASE ORAL DAILY
COMMUNITY

## 2024-05-21 RX ORDER — PREDNISONE 10 MG/1
40 TABLET ORAL DAILY
Qty: 20 TABLET | Refills: 0 | Status: SHIPPED | OUTPATIENT
Start: 2024-05-21 | End: 2024-05-26

## 2024-05-21 NOTE — PROGRESS NOTES
"Chief Complaint  Shoulder Pain (Left shoulder pain x4 weeks)    Subjective          Kandi Humphries presents to [facility]  History of Present Illness    Patient states that about a month ago she woke up with pain and had no injury that she is aware of. She states that she has been having trouble with movement especially when she is pulling her arm back into extension.  She does not have a significant decrease in range of motion, but has motions that are more painful.  She has done occasional anti-inflammatories without consistent relief.    Objective   Vital Signs:   /80   Pulse 75   Ht 177.8 cm (70\")   Wt 91.6 kg (202 lb)   SpO2 98%   BMI 28.98 kg/m²     Body mass index is 28.98 kg/m².    Review of Systems    Past History:  Medical History: has a past medical history of Allergic, Anxiety, Depression, H/O abdominoplasty, Hypertension, and Monty-en-Y varices (2011).   Surgical History: has a past surgical history that includes Reduction mammaplasty (Bilateral, 2013); Gastric bypass; Bariatric Surgery (2011); Cosmetic surgery (2013); Endometrial ablation (May 2020); Colonoscopy (2023); and Eye surgery (2005).   Family History: family history includes Brain cancer in her paternal uncle; Coronary artery disease in her paternal grandfather; Depression in her mother and sister; Diabetes in her mother, paternal grandmother, and sister; Heart attack in her paternal grandfather; Heart disease in her paternal grandfather; Hyperlipidemia in her mother; Hypertension in her father and mother; Lung cancer in her paternal grandfather; Vision loss in her paternal grandfather.   Social History: reports that she has never smoked. She has never used smokeless tobacco. She reports current alcohol use of about 3.0 standard drinks of alcohol per week. She reports that she does not use drugs.      Current Outpatient Medications:     Calcium Citrate 333 MG tablet, , Disp: , Rfl:     Coenzyme Q10 " (Co Q 10) 100 MG capsule, , Disp: , Rfl:     Iron Combinations (IRON COMPLEX PO), Take  by mouth., Disp: , Rfl:     l-methylfolate 15 MG tablet tablet, Take 1 tablet by mouth Daily., Disp: , Rfl:     lansoprazole (PREVACID) 30 MG capsule, Take 1 capsule by mouth Daily., Disp: , Rfl:     Magnesium 250 MG tablet, , Disp: , Rfl:     multivitamin with minerals tablet tablet, , Disp: , Rfl:     olmesartan (BENICAR) 5 MG tablet, Take 1 tablet by mouth Daily., Disp: 90 tablet, Rfl: 1    Omega 3-6-9 Fatty Acids (Omega 3-6-9 Complex) capsule, , Disp: , Rfl:     predniSONE (DELTASONE) 10 MG tablet, Take 4 tablets by mouth Daily for 5 days., Disp: 20 tablet, Rfl: 0    sertraline (ZOLOFT) 25 MG tablet, Take 1 tablet by mouth Daily., Disp: 90 tablet, Rfl: 3    traZODone (DESYREL) 50 MG tablet, Take 1 tablet by mouth Every Night., Disp: 90 tablet, Rfl: 3    vitamin B-12 (CYANOCOBALAMIN) 1000 MCG tablet, , Disp: , Rfl:     vitamin D3 125 MCG (5000 UT) capsule capsule, , Disp: , Rfl:     Allergies: Acetaminophen and Oxycodone hcl    Physical Exam  Constitutional:       General: She is not in acute distress.     Appearance: She is not ill-appearing or toxic-appearing.   HENT:      Head: Normocephalic and atraumatic.   Cardiovascular:      Rate and Rhythm: Normal rate and regular rhythm.      Heart sounds: No murmur heard.  Pulmonary:      Effort: Pulmonary effort is normal. No respiratory distress.   Musculoskeletal:      Right lower leg: No edema.      Left lower leg: No edema.      Comments: Pain with palpation of the posterior aspect of the shoulder.  No significant decreased range of motion, but tender with full extension of the shoulder.   Neurological:      General: No focal deficit present.      Mental Status: She is alert and oriented to person, place, and time.   Psychiatric:         Mood and Affect: Mood normal.         Thought Content: Thought content normal.          Result Review :                   Assessment and Plan     Diagnoses and all orders for this visit:    1. Acute pain of left shoulder (Primary)  -     XR Shoulder 2+ View Left (In Office)    Other orders  -     predniSONE (DELTASONE) 10 MG tablet; Take 4 tablets by mouth Daily for 5 days.  Dispense: 20 tablet; Refill: 0    Will do Xray today and contact with results. prednisone for pain.  If no improvement in the next 7 to 10 days will order PT. Advised follow up in 4-6 weeks to discuss advanced imaging.       Follow Up   No follow-ups on file.  Patient was given instructions and counseling regarding her condition or for health maintenance advice. Please see specific information pulled into the AVS if appropriate.     Marquita Monreal, DO

## 2024-07-30 ENCOUNTER — PATIENT MESSAGE (OUTPATIENT)
Dept: FAMILY MEDICINE CLINIC | Facility: CLINIC | Age: 49
End: 2024-07-30
Payer: COMMERCIAL

## 2024-07-30 DIAGNOSIS — I10 PRIMARY HYPERTENSION: ICD-10-CM

## 2024-07-30 RX ORDER — OLMESARTAN MEDOXOMIL 5 MG/1
5 TABLET ORAL DAILY
Qty: 90 TABLET | Refills: 1 | Status: SHIPPED | OUTPATIENT
Start: 2024-07-30

## 2024-08-12 ENCOUNTER — OFFICE VISIT (OUTPATIENT)
Age: 49
End: 2024-08-12
Payer: COMMERCIAL

## 2024-08-12 ENCOUNTER — LAB (OUTPATIENT)
Dept: LAB | Facility: HOSPITAL | Age: 49
End: 2024-08-12
Payer: COMMERCIAL

## 2024-08-12 VITALS
HEIGHT: 55 IN | DIASTOLIC BLOOD PRESSURE: 70 MMHG | SYSTOLIC BLOOD PRESSURE: 136 MMHG | HEART RATE: 62 BPM | WEIGHT: 201.8 LBS | BODY MASS INDEX: 46.7 KG/M2

## 2024-08-12 DIAGNOSIS — Z98.84 HISTORY OF ROUX-EN-Y GASTRIC BYPASS: Primary | ICD-10-CM

## 2024-08-12 DIAGNOSIS — E66.3 OVERWEIGHT: ICD-10-CM

## 2024-08-12 DIAGNOSIS — Z71.3 DIETARY COUNSELING: ICD-10-CM

## 2024-08-12 DIAGNOSIS — Z98.84 HISTORY OF ROUX-EN-Y GASTRIC BYPASS: ICD-10-CM

## 2024-08-12 LAB
25(OH)D3 SERPL-MCNC: 78 NG/ML (ref 30–100)
FERRITIN SERPL-MCNC: 112 NG/ML (ref 13–150)
HBA1C MFR BLD: 4.7 % (ref 4.8–5.6)
IRON 24H UR-MRATE: 164 MCG/DL (ref 37–145)
MAGNESIUM SERPL-MCNC: 2.4 MG/DL (ref 1.6–2.6)
PHOSPHATE SERPL-MCNC: 3 MG/DL (ref 2.5–4.5)
PREALB SERPL-MCNC: 32.4 MG/DL (ref 20–40)
PTH-INTACT SERPL-MCNC: 36.9 PG/ML (ref 15–65)

## 2024-08-12 PROCEDURE — 82728 ASSAY OF FERRITIN: CPT

## 2024-08-12 PROCEDURE — 84597 ASSAY OF VITAMIN K: CPT

## 2024-08-12 PROCEDURE — 36415 COLL VENOUS BLD VENIPUNCTURE: CPT

## 2024-08-12 PROCEDURE — 83735 ASSAY OF MAGNESIUM: CPT

## 2024-08-12 PROCEDURE — 83540 ASSAY OF IRON: CPT

## 2024-08-12 PROCEDURE — 84425 ASSAY OF VITAMIN B-1: CPT

## 2024-08-12 PROCEDURE — 84630 ASSAY OF ZINC: CPT

## 2024-08-12 PROCEDURE — 99203 OFFICE O/P NEW LOW 30 MIN: CPT | Performed by: SURGERY

## 2024-08-12 PROCEDURE — 84590 ASSAY OF VITAMIN A: CPT

## 2024-08-12 PROCEDURE — 82306 VITAMIN D 25 HYDROXY: CPT

## 2024-08-12 PROCEDURE — 84134 ASSAY OF PREALBUMIN: CPT

## 2024-08-12 PROCEDURE — 83036 HEMOGLOBIN GLYCOSYLATED A1C: CPT

## 2024-08-12 PROCEDURE — 84446 ASSAY OF VITAMIN E: CPT

## 2024-08-12 PROCEDURE — 84100 ASSAY OF PHOSPHORUS: CPT

## 2024-08-12 PROCEDURE — 83970 ASSAY OF PARATHORMONE: CPT

## 2024-08-12 NOTE — PROGRESS NOTES
"Chief Complaint  Follow-up (RNY 2011)    Subjective        Kandi Humphries presents to [facility]  History of Present Illness  Patient presents for follow-up with her previous Monty-en-Y gastric bypass surgery--patient previously seen in [facility] more than 18 months ago, I was following her there but she had her surgery in [place] at [facility] around 5 years ago--patient is doing well, she currently weighs 201 pounds which represents about a 10 pound weight gain since I last saw her--patient states her main challenges are that she works from home now and can get into a bit of a stacking habit while at work--reviewed her exercise and she is doing well with that, mostly walking--patient recently had labs drawn with her PCP which looked okay, he did not have any vitamin levels drawn so we are going to add those on today--patient does state that she is compliant with her multivitamin  Objective   Vital Signs:  /70 Comment (BP Location): ARM  Pulse 62   Ht 70 cm (27.56\")   Wt 91.5 kg (201 lb 12.8 oz)   .81 kg/m²   Estimated body mass index is 186.81 kg/m² as calculated from the following:    Height as of this encounter: 70 cm (27.56\").    Weight as of this encounter: 91.5 kg (201 lb 12.8 oz).               Physical Exam   Alert, pleasant  No respiratory distress  Abdomen soft  Result Review :                   Assessment and Plan   Diagnoses and all orders for this visit:    1. History of Monty-en-Y gastric bypass (Primary)  -     Ferritin; Future  -     Iron; Future  -     Magnesium; Future  -     Hemoglobin A1c; Future  -     Zinc; Future  -     Vitamin K1; Future  -     Vitamin E; Future  -     Vitamin D,25-Hydroxy; Future  -     Vitamin B1, Whole Blood; Future  -     Vitamin A; Future  -     PTH, Intact; Future  -     Prealbumin; Future  -     Phosphorus; Future    2. Overweight    3. Dietary counseling    Overall doing well given the fact " that she has been maintaining a 90 to 100 pound weight loss since her surgery--at this point medication does not seem necessary as her BMI is only 28--I did tell patient we will monitor this, he has episodes of hypoglycemia some not sure GLP-1 would be a great choice for her if in fact her BMI continue to increase--we will draw labs and have the patient follow-up in 6 months 1 year.       I spent 20 minutes caring for Kandi on this date of service. This time includes time spent by me in the following activities:preparing for the visit, reviewing tests, obtaining and/or reviewing a separately obtained history, performing a medically appropriate examination and/or evaluation , counseling and educating the patient/family/caregiver, documenting information in the medical record, and independently interpreting results and communicating that information with the patient/family/caregiver  Follow Up   No follow-ups on file.  Patient was given instructions and counseling regarding her condition or for health maintenance advice. Please see specific information pulled into the AVS if appropriate.

## 2024-08-14 LAB
A-TOCOPHEROL VIT E SERPL-MCNC: 17.6 MG/L (ref 7–25.1)
GAMMA TOCOPHEROL SERPL-MCNC: 0.8 MG/L (ref 0.5–5.5)
VIT A SERPL-MCNC: 77.1 UG/DL (ref 20.1–62)
ZINC SERPL-MCNC: 76 UG/DL (ref 44–115)

## 2024-08-15 LAB — VIT B1 BLD-SCNC: 149.8 NMOL/L (ref 66.5–200)

## 2024-08-23 LAB — PHYTONADIONE SERPL-MCNC: 1.01 NG/ML (ref 0.1–2.2)

## 2025-01-27 DIAGNOSIS — I10 PRIMARY HYPERTENSION: ICD-10-CM

## 2025-01-27 RX ORDER — OLMESARTAN MEDOXOMIL 5 MG/1
5 TABLET ORAL DAILY
Qty: 90 TABLET | Refills: 0 | Status: SHIPPED | OUTPATIENT
Start: 2025-01-27

## 2025-04-04 ENCOUNTER — OFFICE VISIT (OUTPATIENT)
Dept: FAMILY MEDICINE CLINIC | Facility: CLINIC | Age: 50
End: 2025-04-04
Payer: COMMERCIAL

## 2025-04-04 VITALS
HEART RATE: 86 BPM | BODY MASS INDEX: 29.96 KG/M2 | OXYGEN SATURATION: 97 % | SYSTOLIC BLOOD PRESSURE: 140 MMHG | DIASTOLIC BLOOD PRESSURE: 76 MMHG | HEIGHT: 71 IN | WEIGHT: 214 LBS

## 2025-04-04 DIAGNOSIS — F41.1 GAD (GENERALIZED ANXIETY DISORDER): ICD-10-CM

## 2025-04-04 DIAGNOSIS — N76.0 ACUTE VAGINITIS: ICD-10-CM

## 2025-04-04 DIAGNOSIS — R23.2 HOT FLASHES: Primary | ICD-10-CM

## 2025-04-04 PROCEDURE — 99214 OFFICE O/P EST MOD 30 MIN: CPT | Performed by: STUDENT IN AN ORGANIZED HEALTH CARE EDUCATION/TRAINING PROGRAM

## 2025-04-04 NOTE — PROGRESS NOTES
"Chief Complaint  Anxiety (Hormone changes? And the zoloft doesn't seem to be working)    Subjective          Kandi Humphries presents to [facility]  History of Present Illness    Patient is here to discuss possible hormone changes as well as worsening anxiety.    Patient states that for the past several months she has had worsening anxiety and feels like the dose of Zoloft that she has taken for several years is not helping as much as it has been previously.  She would like to discuss options for her worsening anxiety.    Patient also states that she has been having more issues with hot flashes, night sweats, and vaginal irritation.  She states that she has had no exposure to any new soaps or detergents, but has had worsening of the itching and burning in the labia.  She states that she has tried using lubricants, but does not have any significant dryness in the vaginal area, only on the labia.  She has used some over-the-counter Monistat creams with mild relief short-term.    Objective   Vital Signs:   /76   Pulse 86   Ht 180.3 cm (71\")   Wt 97.1 kg (214 lb)   SpO2 97%   BMI 29.85 kg/m²     Body mass index is 29.85 kg/m².    Review of Systems    Past History:  Medical History: has a past medical history of Allergic, Anxiety, Depression, H/O abdominoplasty, Hypertension, and Monty-en-Y varices (2011).   Surgical History: has a past surgical history that includes Reduction mammaplasty (Bilateral, 2013); Gastric bypass; Bariatric Surgery (2011); Cosmetic surgery (2013); Endometrial ablation (May 2020); Colonoscopy (2023); and Eye surgery (2005).   Family History: family history includes Brain cancer in her paternal uncle; Coronary artery disease in her paternal grandfather; Depression in her mother and sister; Diabetes in her mother, paternal grandmother, and sister; Heart attack in her paternal grandfather; Heart disease in her paternal grandfather; Hyperlipidemia in her " mother; Hypertension in her father and mother; Lung cancer in her paternal grandfather; Vision loss in her paternal grandfather.   Social History: reports that she has never smoked. She has never used smokeless tobacco. She reports current alcohol use of about 3.0 standard drinks of alcohol per week. She reports that she does not use drugs.      Current Outpatient Medications:     Calcium Citrate 333 MG tablet, , Disp: , Rfl:     Coenzyme Q10 (Co Q 10) 100 MG capsule, , Disp: , Rfl:     Iron Combinations (IRON COMPLEX PO), Take  by mouth., Disp: , Rfl:     l-methylfolate 15 MG tablet tablet, Take 1 tablet by mouth Daily., Disp: , Rfl:     lansoprazole (PREVACID) 30 MG capsule, Take 1 capsule by mouth Daily., Disp: , Rfl:     Magnesium 250 MG tablet, , Disp: , Rfl:     multivitamin with minerals tablet tablet, , Disp: , Rfl:     olmesartan (BENICAR) 5 MG tablet, Take 1 tablet by mouth once daily, Disp: 90 tablet, Rfl: 0    Omega 3-6-9 Fatty Acids (Omega 3-6-9 Complex) capsule, , Disp: , Rfl:     sertraline (ZOLOFT) 50 MG tablet, Take 1 tablet by mouth Daily., Disp: 30 tablet, Rfl: 1    traZODone (DESYREL) 50 MG tablet, Take 1 tablet by mouth Every Night., Disp: 90 tablet, Rfl: 3    vitamin B-12 (CYANOCOBALAMIN) 1000 MCG tablet, , Disp: , Rfl:     vitamin D3 125 MCG (5000 UT) capsule capsule, , Disp: , Rfl:     Allergies: Acetaminophen, Oxycodone hcl, and Oxycodone-acetaminophen    Physical Exam  Constitutional:       General: She is not in acute distress.     Appearance: She is not ill-appearing or toxic-appearing.   HENT:      Head: Normocephalic and atraumatic.   Cardiovascular:      Rate and Rhythm: Normal rate and regular rhythm.      Heart sounds: No murmur heard.  Pulmonary:      Effort: Pulmonary effort is normal. No respiratory distress.   Genitourinary:     Comments: Deferred as patient has an upcoming GYN appointment  Neurological:      General: No focal deficit present.      Mental Status: She is alert and  oriented to person, place, and time.   Psychiatric:         Mood and Affect: Mood normal.         Thought Content: Thought content normal.          Result Review :                   Assessment and Plan    Diagnoses and all orders for this visit:    1. Hot flashes (Primary)  -     Cancel: Estrogens, Total; Future  -     Cancel: Progesterone; Future  -     Cancel: FSH & LH; Future  -     NuSwab VG+ - Swab, Vagina  -     Estrogens, Total  -     FSH & LH  -     Progesterone    2. Acute vaginitis  -     Cancel: Estrogens, Total; Future  -     Cancel: Progesterone; Future  -     Cancel: FSH & LH; Future  -     NuSwab VG+ - Swab, Vagina  -     Estrogens, Total  -     FSH & LH  -     Progesterone    3. JACIEL (generalized anxiety disorder)  -     sertraline (ZOLOFT) 50 MG tablet; Take 1 tablet by mouth Daily.  Dispense: 30 tablet; Refill: 1    Discussed at length with patient her worsening anxiety and recommended that we increase the dose of Zoloft, likely short-term until things settle down both at home and work.  Advised that if she has any difficulty with this medication to contact the office, otherwise we will follow-up in about 6 weeks.    Will do hormonal testing as well as NuSwab to rule out any acute abnormalities as well as see where she is in the menopausal area as she has had a endometrial ablation and no longer has menstrual cycles.    Follow Up   No follow-ups on file.  Patient was given instructions and counseling regarding her condition or for health maintenance advice. Please see specific information pulled into the AVS if appropriate.     Marquita Monreal, DO

## 2025-04-07 LAB
A VAGINAE DNA VAG QL NAA+PROBE: NORMAL SCORE
BVAB2 DNA VAG QL NAA+PROBE: NORMAL SCORE
C ALBICANS DNA VAG QL NAA+PROBE: NEGATIVE
C GLABRATA DNA VAG QL NAA+PROBE: NEGATIVE
C TRACH DNA SPEC QL NAA+PROBE: NEGATIVE
MEGA1 DNA VAG QL NAA+PROBE: NORMAL SCORE
N GONORRHOEA DNA VAG QL NAA+PROBE: NEGATIVE
T VAGINALIS DNA VAG QL NAA+PROBE: NEGATIVE

## 2025-04-09 LAB
ESTROGEN SERPL-MCNC: 456 PG/ML
FSH SERPL-ACNC: 2.5 MIU/ML
LH SERPL-ACNC: 3.6 MIU/ML
PROGEST SERPL-MCNC: 7.7 NG/ML

## 2025-04-23 DIAGNOSIS — I10 PRIMARY HYPERTENSION: ICD-10-CM

## 2025-04-23 RX ORDER — OLMESARTAN MEDOXOMIL 5 MG/1
5 TABLET ORAL DAILY
Qty: 90 TABLET | Refills: 0 | Status: SHIPPED | OUTPATIENT
Start: 2025-04-23

## 2025-05-30 ENCOUNTER — TELEMEDICINE (OUTPATIENT)
Dept: FAMILY MEDICINE CLINIC | Facility: CLINIC | Age: 50
End: 2025-05-30
Payer: COMMERCIAL

## 2025-05-30 DIAGNOSIS — F41.1 GAD (GENERALIZED ANXIETY DISORDER): ICD-10-CM

## 2025-05-30 DIAGNOSIS — I10 PRIMARY HYPERTENSION: ICD-10-CM

## 2025-05-30 PROCEDURE — 99213 OFFICE O/P EST LOW 20 MIN: CPT | Performed by: STUDENT IN AN ORGANIZED HEALTH CARE EDUCATION/TRAINING PROGRAM

## 2025-05-30 RX ORDER — OLMESARTAN MEDOXOMIL 5 MG/1
5 TABLET ORAL DAILY
Qty: 90 TABLET | Refills: 3 | Status: SHIPPED | OUTPATIENT
Start: 2025-05-30

## 2025-05-30 NOTE — PROGRESS NOTES
Chief Complaint  No chief complaint on file.    Subjective          Kandi Humphries presents to [facility]  History of Present Illness    Patient states that she has been doing ok at this time. She states that she has had no panic attacks since she increased the dose. She states that she still has some days where she feels anxiety but she is able to easily direct.     Objective   Vital Signs:   There were no vitals taken for this visit.    There is no height or weight on file to calculate BMI.    Review of Systems    Past History:  Medical History: has a past medical history of Allergic, Anxiety, Depression, H/O abdominoplasty, Hypertension, and Monty-en-Y varices (2011).   Surgical History: has a past surgical history that includes Reduction mammaplasty (Bilateral, 2013); Gastric bypass; Bariatric Surgery (2011); Cosmetic surgery (2013); Endometrial ablation (May 2020); Colonoscopy (2023); and Eye surgery (2005).   Family History: family history includes Brain cancer in her paternal uncle; Coronary artery disease in her paternal grandfather; Depression in her mother and sister; Diabetes in her mother, paternal grandmother, and sister; Heart attack in her paternal grandfather; Heart disease in her paternal grandfather; Hyperlipidemia in her mother; Hypertension in her father and mother; Lung cancer in her paternal grandfather; Vision loss in her paternal grandfather.   Social History: reports that she has never smoked. She has never used smokeless tobacco. She reports current alcohol use of about 3.0 standard drinks of alcohol per week. She reports that she does not use drugs.      Current Outpatient Medications:     olmesartan (BENICAR) 5 MG tablet, Take 1 tablet by mouth Daily., Disp: 90 tablet, Rfl: 3    sertraline (ZOLOFT) 50 MG tablet, Take 1 tablet by mouth Daily., Disp: 90 tablet, Rfl: 1    Calcium Citrate 333 MG tablet, , Disp: , Rfl:     Coenzyme Q10 (Co Q 10) 100 MG capsule,  , Disp: , Rfl:     Iron Combinations (IRON COMPLEX PO), Take  by mouth., Disp: , Rfl:     l-methylfolate 15 MG tablet tablet, Take 1 tablet by mouth Daily., Disp: , Rfl:     lansoprazole (PREVACID) 30 MG capsule, Take 1 capsule by mouth Daily., Disp: , Rfl:     Magnesium 250 MG tablet, , Disp: , Rfl:     multivitamin with minerals tablet tablet, , Disp: , Rfl:     Omega 3-6-9 Fatty Acids (Omega 3-6-9 Complex) capsule, , Disp: , Rfl:     traZODone (DESYREL) 50 MG tablet, Take 1 tablet by mouth Every Night., Disp: 90 tablet, Rfl: 3    vitamin B-12 (CYANOCOBALAMIN) 1000 MCG tablet, , Disp: , Rfl:     vitamin D3 125 MCG (5000 UT) capsule capsule, , Disp: , Rfl:     Allergies: Acetaminophen, Oxycodone hcl, and Oxycodone-acetaminophen    Physical Exam  Constitutional:       Comments: Limited 2/2 Virtual visit.    Pulmonary:      Comments: Speaking in complete sentences. No audible wheezing  Neurological:      Mental Status: She is alert and oriented to person, place, and time.   Psychiatric:         Mood and Affect: Mood normal.         Thought Content: Thought content normal.          Result Review :                   Assessment and Plan    Diagnoses and all orders for this visit:    1. JACIEL (generalized anxiety disorder)  -     sertraline (ZOLOFT) 50 MG tablet; Take 1 tablet by mouth Daily.  Dispense: 90 tablet; Refill: 1    2. Primary hypertension  -     olmesartan (BENICAR) 5 MG tablet; Take 1 tablet by mouth Daily.  Dispense: 90 tablet; Refill: 3    Patient overall doing well at this time.  She has noted significant improvement in her symptoms since increasing the dose of Zoloft.  Will continue at this time.    Other medications refilled at current doses.  Follow-up in 3 months for well visit    Discussed limitations to virtual visit and patient was agreeable to continue. Discussed that because of the limitations of minimal physical exam that if there is interval worsening they should present to the office for either  curbside visit, or to the emergency department for further evaluation and definitive management. Patient was agreeable to this.    Mode of Visit: Video  Location of patient: home  [place] [facility]  You have chosen to receive care through a telehealth visit.  Does the patient consent to use a video/audio connection for your medical care today? Yes  The visit included audio and video interaction. No technical issues occurred during this visit.     I spent 14 minutes caring for Kandi on this date of service. This time includes time spent by me in the following activities:preparing for the visit, reviewing tests, ordering medications, tests, or procedures, referring and communicating with other health care professionals , and documenting information in the medical record  Follow Up   No follow-ups on file.  Patient was given instructions and counseling regarding her condition or for health maintenance advice. Please see specific information pulled into the AVS if appropriate.     Marquita Monreal, DO